# Patient Record
Sex: MALE | Race: WHITE | NOT HISPANIC OR LATINO
[De-identification: names, ages, dates, MRNs, and addresses within clinical notes are randomized per-mention and may not be internally consistent; named-entity substitution may affect disease eponyms.]

---

## 2024-03-11 PROBLEM — Z00.00 ENCOUNTER FOR PREVENTIVE HEALTH EXAMINATION: Status: ACTIVE | Noted: 2024-03-11

## 2024-03-12 ENCOUNTER — APPOINTMENT (OUTPATIENT)
Dept: PAIN MANAGEMENT | Facility: CLINIC | Age: 54
End: 2024-03-12
Payer: COMMERCIAL

## 2024-03-12 VITALS
SYSTOLIC BLOOD PRESSURE: 141 MMHG | DIASTOLIC BLOOD PRESSURE: 72 MMHG | HEIGHT: 68 IN | OXYGEN SATURATION: 98 % | WEIGHT: 180 LBS | BODY MASS INDEX: 27.28 KG/M2 | HEART RATE: 72 BPM

## 2024-03-12 DIAGNOSIS — M53.3 SACROCOCCYGEAL DISORDERS, NOT ELSEWHERE CLASSIFIED: ICD-10-CM

## 2024-03-12 PROCEDURE — 99203 OFFICE O/P NEW LOW 30 MIN: CPT

## 2024-03-12 RX ORDER — VALSARTAN 80 MG/1
80 TABLET, COATED ORAL
Refills: 0 | Status: ACTIVE | COMMUNITY

## 2024-03-12 NOTE — HISTORY OF PRESENT ILLNESS
[Back Pain] : back pain [___ yrs] : [unfilled] year(s) ago [3] : an average pain level of 3/10 [6] : a maximum pain level of 6/10 [Dull] : dull [Aching] : aching [Stabbing] : stabbing [Sitting] : sitting [Bending] : bending [Laying] : laying [Standing] : standing [Walking] : walking [Ice] : ice [FreeTextEntry1] : HPI - Mr. EDISON LAL is a patient who presents today with chief complaints of back pain. Referred by Dr Eliu Arizmendi  He reports that the pain developed 15 years ago and is located in the lumbar spine, specifically on the right side, along with tightness in the hips. There is no radiation of the pain. The pain is presently 3/10 in severity on the numerical rating scale, with an average pain level of 3/10, a minimum pain level of 3/10, and a maximum pain level of 6/10. It is dull, aching, and stabbing in nature. The pain is not constant. There is no diurnal worsening. The pain is aggravated with sitting and bending. The pain improves with rest, laying, standing, walking, and ice, as well as stretching and Pilates. The pain is not functionally and emotionally disabling for the patient as it's not preventing them from going about activities of daily living. The pain does not impair the patient's ability to sleep.  Patient was seen by pain management in the past and has undergone physical therapy.  Patient attests to 6 weeks of provider-directed treatment, including a provider-directed stretching regimen, Pilates.  Patient reports treatment that occurred within the last 3 months.  Patient reports that symptoms have been persistent and progressing after treatment. Had C3/4 disc extrusion approximately 20 yrs ago, [facility] ACD[facility]  Reports there is NO present numbness, there is NO weakness. Patient denies any bowel/bladder incontinence, no saddle/perineal anesthesia or any other red flag signs or symptoms. Reports regular bowel movements. [FreeTextEntry7] : lumber spine right side tighteness in the hips  [FreeTextEntry4] : stretching, Pilates. has done PT in the past.

## 2024-03-12 NOTE — PHYSICAL EXAM
[de-identified] : General Appearance: Level of consciousness: Alert Body habitus: Well-nourished Signs of distress: Some noticeable discomfort. Hygiene: Clean   Psychiatric: Appropriate mood and affect. Cooperative.   Skin: Nailbeds pink with no cyanosis or clubbing. Lesions or rashes: None observed   Head and Neck: The head is normocephalic and atraumatic Eyes: Conjunctivae  clear without exudates. Sclera is non-icteric Ears: No discharge. Hearing is intact with good acuity Nose: No discharge. No nasal flaring Mouth and throat: Trachea Midline, teeth and gums are without obvious lesion   Respiratory: Breathing pattern: Normal Chest movement: Symmetrical Use of accessory muscles: Absent Cough: Absent Wheezing: Absent   Cardiovascular System: Pulse: Regular Cyanosis: Absent   Abdomen: Inspection: No distention Visible pulsations: Absent   Musculoskeletal System: Muscle strength:   strength is normal bilaterally. Gait: Steady, NO assistive device Posture: Upright Rises from a seated position with SOME difficulty due to pain     Spine: No gross deformity or signs of trauma. Curvature of the cervical, thoracic, and lumbar spine are within normal limits. Paraspinal musculature is NOT hypertonic NO discomfort is noted with flexion MILD discomfort is noted with extension No discomfort is noted with side-to-side rotation EDITA - positive RIGHT  Right hip flexors and adductor hypertonicity   Straight leg raise test is negative bilaterally.   Joint examination: No Swelling, No gross deformities.     Neurological System: Cranial nerves 2-12: Grossly intact Motor function: Moving all extremities against gravity Dorsi/plantar flexion is normal bilaterally. Sensation: No gross deficit to light touch + Hoffmans Bilat Right patellar reflex +3

## 2024-03-12 NOTE — CONSULT LETTER
[Dear  ___] : Dear  [unfilled], [Consult Letter:] : I had the pleasure of evaluating your patient, [unfilled]. [Please see my note below.] : Please see my note below. [Consult Closing:] : Thank you very much for allowing me to participate in the care of this patient.  If you have any questions, please do not hesitate to contact me. [Sincerely,] : Sincerely, [FreeTextEntry3] : Vincent Mckay DO

## 2024-03-12 NOTE — ASSESSMENT
[FreeTextEntry1] : Mr. EDISON LAL is a 53 year M suffering from right sided back and hip pain (prior history of cervical myelopathy), that based upon today's subjective complaints, physical examination, and chart  review, is likely multifactorial with component of sacroiliac joint dyfunciton  >> Medications  Chronic opioid use for non-malignant pain, in particular at high doses would not be recommended since it can potentially lead to hyperalgesia (hypersensitivity), tolerance and addiction.    CW Ibuprofen PRN   >> Interventions   None indicated at this time, if pain persists consider SIJ injection   >> Therapy and Other Modalities   Continue with daily home stretching regimen  >> Imaging and Other Studies  XRAY Lumbar Spine and Pelvis  Consider MRI lumbar vs EMG  >> Consults  None ordered

## 2024-03-20 ENCOUNTER — RESULT REVIEW (OUTPATIENT)
Age: 54
End: 2024-03-20